# Patient Record
Sex: FEMALE | Race: BLACK OR AFRICAN AMERICAN | NOT HISPANIC OR LATINO | Employment: OTHER | URBAN - METROPOLITAN AREA
[De-identification: names, ages, dates, MRNs, and addresses within clinical notes are randomized per-mention and may not be internally consistent; named-entity substitution may affect disease eponyms.]

---

## 2022-07-08 PROBLEM — R53.83 FATIGUE: Status: ACTIVE | Noted: 2022-07-08

## 2022-07-08 PROBLEM — H40.9 GLAUCOMA: Status: ACTIVE | Noted: 2022-07-08

## 2022-07-08 PROBLEM — K52.9 CHRONIC DIARRHEA: Status: ACTIVE | Noted: 2022-07-08

## 2022-07-08 PROBLEM — F32.A DEPRESSION: Status: ACTIVE | Noted: 2022-07-08

## 2022-07-08 PROBLEM — E66.9 OBESITY: Status: ACTIVE | Noted: 2022-07-08

## 2022-07-08 PROBLEM — G40.409: Status: ACTIVE | Noted: 2022-07-08

## 2022-07-08 PROBLEM — R60.0 BILATERAL LEG EDEMA: Status: ACTIVE | Noted: 2022-07-08

## 2022-07-08 PROBLEM — M79.604 RIGHT LEG PAIN: Status: ACTIVE | Noted: 2022-07-08

## 2022-09-30 PROBLEM — K21.00 GASTROESOPHAGEAL REFLUX DISEASE WITH ESOPHAGITIS WITHOUT HEMORRHAGE: Status: ACTIVE | Noted: 2022-09-30

## 2024-10-10 ENCOUNTER — PATIENT OUTREACH (OUTPATIENT)
Dept: ADMINISTRATIVE | Facility: HOSPITAL | Age: 49
End: 2024-10-10

## 2024-10-10 PROBLEM — Z91.89 AT RISK FOR OBSTRUCTIVE SLEEP APNEA: Status: ACTIVE | Noted: 2024-10-10

## 2025-05-27 ENCOUNTER — PATIENT OUTREACH (OUTPATIENT)
Dept: ADMINISTRATIVE | Facility: HOSPITAL | Age: 50
End: 2025-05-27

## 2025-05-27 NOTE — PROGRESS NOTES
5-26-25 please address open care gap cervical cancer and mammogram screening, please offer self swab during visit, noted on 5-27-25 appt notes